# Patient Record
Sex: FEMALE | Race: ASIAN | HISPANIC OR LATINO
[De-identification: names, ages, dates, MRNs, and addresses within clinical notes are randomized per-mention and may not be internally consistent; named-entity substitution may affect disease eponyms.]

---

## 2017-09-15 PROBLEM — Z00.00 ENCOUNTER FOR PREVENTIVE HEALTH EXAMINATION: Status: ACTIVE | Noted: 2017-09-15

## 2017-09-26 ENCOUNTER — APPOINTMENT (OUTPATIENT)
Dept: ORTHOPEDIC SURGERY | Facility: CLINIC | Age: 57
End: 2017-09-26
Payer: OTHER MISCELLANEOUS

## 2017-09-26 VITALS — BODY MASS INDEX: 27.16 KG/M2 | WEIGHT: 163 LBS | HEIGHT: 65 IN

## 2017-09-26 VITALS — DIASTOLIC BLOOD PRESSURE: 78 MMHG | SYSTOLIC BLOOD PRESSURE: 145 MMHG | HEART RATE: 82 BPM

## 2017-09-26 DIAGNOSIS — Z87.09 PERSONAL HISTORY OF OTHER DISEASES OF THE RESPIRATORY SYSTEM: ICD-10-CM

## 2017-09-26 DIAGNOSIS — Z78.9 OTHER SPECIFIED HEALTH STATUS: ICD-10-CM

## 2017-09-26 PROCEDURE — 99244 OFF/OP CNSLTJ NEW/EST MOD 40: CPT

## 2017-09-26 PROCEDURE — 72100 X-RAY EXAM L-S SPINE 2/3 VWS: CPT

## 2017-09-26 PROCEDURE — 72040 X-RAY EXAM NECK SPINE 2-3 VW: CPT

## 2017-09-26 PROCEDURE — 72070 X-RAY EXAM THORAC SPINE 2VWS: CPT

## 2017-11-17 ENCOUNTER — APPOINTMENT (OUTPATIENT)
Dept: ORTHOPEDIC SURGERY | Facility: CLINIC | Age: 57
End: 2017-11-17
Payer: OTHER MISCELLANEOUS

## 2017-11-17 DIAGNOSIS — M54.16 RADICULOPATHY, LUMBAR REGION: ICD-10-CM

## 2017-11-17 DIAGNOSIS — M51.26 OTHER INTERVERTEBRAL DISC DISPLACEMENT, LUMBAR REGION: ICD-10-CM

## 2017-11-17 PROCEDURE — 99215 OFFICE O/P EST HI 40 MIN: CPT

## 2017-12-01 ENCOUNTER — FORM ENCOUNTER (OUTPATIENT)
Age: 57
End: 2017-12-01

## 2017-12-02 ENCOUNTER — APPOINTMENT (OUTPATIENT)
Dept: MRI IMAGING | Facility: CLINIC | Age: 57
End: 2017-12-02
Payer: OTHER MISCELLANEOUS

## 2017-12-02 ENCOUNTER — OUTPATIENT (OUTPATIENT)
Dept: OUTPATIENT SERVICES | Facility: HOSPITAL | Age: 57
LOS: 1 days | End: 2017-12-02
Payer: COMMERCIAL

## 2017-12-02 DIAGNOSIS — Z00.8 ENCOUNTER FOR OTHER GENERAL EXAMINATION: ICD-10-CM

## 2017-12-02 PROCEDURE — 72141 MRI NECK SPINE W/O DYE: CPT | Mod: 26

## 2017-12-02 PROCEDURE — 72141 MRI NECK SPINE W/O DYE: CPT

## 2017-12-04 ENCOUNTER — FORM ENCOUNTER (OUTPATIENT)
Age: 57
End: 2017-12-04

## 2017-12-05 ENCOUNTER — OUTPATIENT (OUTPATIENT)
Dept: OUTPATIENT SERVICES | Facility: HOSPITAL | Age: 57
LOS: 1 days | End: 2017-12-05
Payer: COMMERCIAL

## 2017-12-05 ENCOUNTER — APPOINTMENT (OUTPATIENT)
Dept: MRI IMAGING | Facility: CLINIC | Age: 57
End: 2017-12-05
Payer: OTHER MISCELLANEOUS

## 2017-12-05 DIAGNOSIS — Z00.8 ENCOUNTER FOR OTHER GENERAL EXAMINATION: ICD-10-CM

## 2017-12-05 PROCEDURE — 72148 MRI LUMBAR SPINE W/O DYE: CPT

## 2017-12-05 PROCEDURE — 72148 MRI LUMBAR SPINE W/O DYE: CPT | Mod: 26

## 2017-12-28 ENCOUNTER — APPOINTMENT (OUTPATIENT)
Dept: ORTHOPEDIC SURGERY | Facility: CLINIC | Age: 57
End: 2017-12-28
Payer: OTHER MISCELLANEOUS

## 2017-12-28 DIAGNOSIS — M47.812 SPONDYLOSIS W/OUT MYELOPATHY OR RADICULOPATHY, CERVICAL REGION: ICD-10-CM

## 2017-12-28 DIAGNOSIS — M54.12 RADICULOPATHY, CERVICAL REGION: ICD-10-CM

## 2017-12-28 PROCEDURE — 99215 OFFICE O/P EST HI 40 MIN: CPT

## 2018-02-22 ENCOUNTER — APPOINTMENT (OUTPATIENT)
Dept: ORTHOPEDIC SURGERY | Facility: CLINIC | Age: 58
End: 2018-02-22
Payer: OTHER MISCELLANEOUS

## 2018-02-22 DIAGNOSIS — M50.30 OTHER CERVICAL DISC DEGENERATION, UNSPECIFIED CERVICAL REGION: ICD-10-CM

## 2018-02-22 DIAGNOSIS — M51.36 OTHER INTERVERTEBRAL DISC DEGENERATION, LUMBAR REGION: ICD-10-CM

## 2018-02-22 PROCEDURE — 99215 OFFICE O/P EST HI 40 MIN: CPT

## 2018-07-23 PROBLEM — M47.812 CERVICAL SPONDYLOSIS: Status: ACTIVE | Noted: 2017-12-28

## 2018-08-10 ENCOUNTER — EMERGENCY (EMERGENCY)
Facility: HOSPITAL | Age: 58
LOS: 1 days | Discharge: HOME | End: 2018-08-10
Attending: EMERGENCY MEDICINE
Payer: MEDICAID

## 2018-08-10 VITALS
SYSTOLIC BLOOD PRESSURE: 126 MMHG | DIASTOLIC BLOOD PRESSURE: 69 MMHG | RESPIRATION RATE: 16 BRPM | OXYGEN SATURATION: 99 % | WEIGHT: 158.95 LBS | HEART RATE: 75 BPM | TEMPERATURE: 98 F | HEIGHT: 65 IN

## 2018-08-10 LAB
ALBUMIN SERPL ELPH-MCNC: 4.2 G/DL — SIGNIFICANT CHANGE UP (ref 3.3–5)
ALP SERPL-CCNC: 83 U/L — SIGNIFICANT CHANGE UP (ref 40–120)
ALT FLD-CCNC: 16 U/L — SIGNIFICANT CHANGE UP (ref 10–45)
ANION GAP SERPL CALC-SCNC: 15 MMOL/L — SIGNIFICANT CHANGE UP (ref 5–17)
APTT BLD: 29.4 SEC — SIGNIFICANT CHANGE UP (ref 27.5–37.4)
AST SERPL-CCNC: 16 U/L — SIGNIFICANT CHANGE UP (ref 10–40)
BASOPHILS # BLD AUTO: 0.1 K/UL — SIGNIFICANT CHANGE UP (ref 0–0.2)
BASOPHILS NFR BLD AUTO: 0.6 % — SIGNIFICANT CHANGE UP (ref 0–2)
BILIRUB SERPL-MCNC: 0.7 MG/DL — SIGNIFICANT CHANGE UP (ref 0.2–1.2)
BUN SERPL-MCNC: 14 MG/DL — SIGNIFICANT CHANGE UP (ref 7–23)
CALCIUM SERPL-MCNC: 9.8 MG/DL — SIGNIFICANT CHANGE UP (ref 8.4–10.5)
CHLORIDE SERPL-SCNC: 101 MMOL/L — SIGNIFICANT CHANGE UP (ref 96–108)
CO2 SERPL-SCNC: 23 MMOL/L — SIGNIFICANT CHANGE UP (ref 22–31)
CREAT SERPL-MCNC: 0.7 MG/DL — SIGNIFICANT CHANGE UP (ref 0.5–1.3)
EOSINOPHIL # BLD AUTO: 0.1 K/UL — SIGNIFICANT CHANGE UP (ref 0–0.5)
EOSINOPHIL NFR BLD AUTO: 1.3 % — SIGNIFICANT CHANGE UP (ref 0–6)
GLUCOSE SERPL-MCNC: 99 MG/DL — SIGNIFICANT CHANGE UP (ref 70–99)
HCT VFR BLD CALC: 41.7 % — SIGNIFICANT CHANGE UP (ref 34.5–45)
HGB BLD-MCNC: 14.6 G/DL — SIGNIFICANT CHANGE UP (ref 11.5–15.5)
INR BLD: 1.07 RATIO — SIGNIFICANT CHANGE UP (ref 0.88–1.16)
LYMPHOCYTES # BLD AUTO: 2.5 K/UL — SIGNIFICANT CHANGE UP (ref 1–3.3)
LYMPHOCYTES # BLD AUTO: 25.4 % — SIGNIFICANT CHANGE UP (ref 13–44)
MCHC RBC-ENTMCNC: 30.4 PG — SIGNIFICANT CHANGE UP (ref 27–34)
MCHC RBC-ENTMCNC: 34.9 GM/DL — SIGNIFICANT CHANGE UP (ref 32–36)
MCV RBC AUTO: 87 FL — SIGNIFICANT CHANGE UP (ref 80–100)
MONOCYTES # BLD AUTO: 0.7 K/UL — SIGNIFICANT CHANGE UP (ref 0–0.9)
MONOCYTES NFR BLD AUTO: 7 % — SIGNIFICANT CHANGE UP (ref 2–14)
NEUTROPHILS # BLD AUTO: 6.5 K/UL — SIGNIFICANT CHANGE UP (ref 1.8–7.4)
NEUTROPHILS NFR BLD AUTO: 65.7 % — SIGNIFICANT CHANGE UP (ref 43–77)
PLATELET # BLD AUTO: 288 K/UL — SIGNIFICANT CHANGE UP (ref 150–400)
POTASSIUM SERPL-MCNC: 3.8 MMOL/L — SIGNIFICANT CHANGE UP (ref 3.5–5.3)
POTASSIUM SERPL-SCNC: 3.8 MMOL/L — SIGNIFICANT CHANGE UP (ref 3.5–5.3)
PROT SERPL-MCNC: 7.7 G/DL — SIGNIFICANT CHANGE UP (ref 6–8.3)
PROTHROM AB SERPL-ACNC: 11.7 SEC — SIGNIFICANT CHANGE UP (ref 9.8–12.7)
RBC # BLD: 4.8 M/UL — SIGNIFICANT CHANGE UP (ref 3.8–5.2)
RBC # FLD: 12.3 % — SIGNIFICANT CHANGE UP (ref 10.3–14.5)
SODIUM SERPL-SCNC: 139 MMOL/L — SIGNIFICANT CHANGE UP (ref 135–145)
WBC # BLD: 9.9 K/UL — SIGNIFICANT CHANGE UP (ref 3.8–10.5)
WBC # FLD AUTO: 9.9 K/UL — SIGNIFICANT CHANGE UP (ref 3.8–10.5)

## 2018-08-10 PROCEDURE — 88108 CYTOPATH CONCENTRATE TECH: CPT | Mod: 26

## 2018-08-10 PROCEDURE — 99285 EMERGENCY DEPT VISIT HI MDM: CPT | Mod: 25

## 2018-08-10 PROCEDURE — 62270 DX LMBR SPI PNXR: CPT

## 2018-08-10 PROCEDURE — 70498 CT ANGIOGRAPHY NECK: CPT | Mod: 26

## 2018-08-10 PROCEDURE — 70496 CT ANGIOGRAPHY HEAD: CPT | Mod: 26

## 2018-08-10 RX ORDER — MORPHINE SULFATE 50 MG/1
4 CAPSULE, EXTENDED RELEASE ORAL ONCE
Qty: 0 | Refills: 0 | Status: DISCONTINUED | OUTPATIENT
Start: 2018-08-10 | End: 2018-08-10

## 2018-08-10 RX ADMIN — MORPHINE SULFATE 4 MILLIGRAM(S): 50 CAPSULE, EXTENDED RELEASE ORAL at 18:41

## 2018-08-10 RX ADMIN — MORPHINE SULFATE 4 MILLIGRAM(S): 50 CAPSULE, EXTENDED RELEASE ORAL at 20:12

## 2018-08-10 NOTE — ED PROVIDER NOTE - MEDICAL DECISION MAKING DETAILS
58F headache transfer concern for aneurysm on nchct performed at [facility] now accepted as a transfer by neurosurgery, here notes she has a headache, neuro exam stable, labs, imaging, neurosurg consultation, follow up studies, reassess, dispo.

## 2018-08-10 NOTE — ED PROVIDER NOTE - PLAN OF CARE
You were seen in the emergency department for headache. Please follow up with your neurologist in the next 2-3 days. Continue taking Topamax as prescribed by your neurologist. Return to the emergency department immediately if you experience fever, difficulty walking, worsening headache or any other concerning symptoms.

## 2018-08-10 NOTE — CONSULT NOTE ADULT - ASSESSMENT
58F h/o chornic headaches, has headaches for mulitple weeks now with headache again today, not whol, associated with n/v, and syncopal intact on exam, CTH negative for SAH, but concern for hyperdensity near region of acom. CTH at osh done within 6 hours of initial headache today.   - No acute neurosurgical intervention  - CTA / CTH here  - Will f/u CTH / CTA 58F h/o chornic headaches, has headaches for mulitple weeks now with headache again today, not whol, associated with n/v, and syncopal intact on exam, CTH negative for SAH, but concern for hyperdensity near region of acom. CTH at o[facility] done within 6 hours of initial headache today.   - No acute neurosurgical intervention  - CTA / CTH here  - Will f/u CTH / CTA  - May consider LP but given that initial CTH done within first 6 hours may not be indicated

## 2018-08-10 NOTE — ED PROVIDER NOTE - CARE PLAN
Principal Discharge DX:	Headache Principal Discharge DX:	Headache  Assessment and plan of treatment:	You were seen in the emergency department for headache. Please follow up with your neurologist in the next 2-3 days. Continue taking Topamax as prescribed by your neurologist. Return to the emergency department immediately if you experience fever, difficulty walking, worsening headache or any other concerning symptoms.

## 2018-08-10 NOTE — ED PROVIDER NOTE - PROGRESS NOTE DETAILS
Loc STOCKTON: Patient denies headache, dizziness, nausea. Awaiting CTA results. Loc STOCKTON: Discussed with neurosurgery. CTA shows a 4 mm aneurysm. They recommend an LP. Elizabeth Goldberger PGY-2: Performed LP to r/o xanthochromia/SAH. Prior to LP pt c/o headache and nausea and had 1 brief episode vomiting. Tolerated procedure well, fluid appeared clear.  -LP w/o xanthochromia. D/w neurosx - no surg intervention warranted at this time. Pt was given reglan previously, h/a and naus completely resolved. Pt has neurologist to Follow up with and has topamax as needed for migraines already. Stable for dc ELADIO Vargas MD : reassessed, states sxs are totally resolved. headache gone. LP results w mild traumatic tap downtrending rbcs. cleared by neurosurg. pt states is on topamax w her neuro and will f/u as outpt for ongoing eval. additional verbal instructions regarding diagnosis, return precautions and follow up plan given to pt and/or family.

## 2018-08-10 NOTE — ED ADULT NURSE REASSESSMENT NOTE - NS ED NURSE REASSESS COMMENT FT1
Patient resting in stretcher bed. Awaiting LP by MD Goldberger.  Ipad at bedside for consent to treatment. Safety maintained.

## 2018-08-10 NOTE — ED PROVIDER NOTE - ATTENDING CONTRIBUTION TO CARE
Patient is a 59 yo F who syncopized this AM, woke with a headache and vomited. She went to [facility] and had a CT that showed a questionable aneurysm. Transferred to [facility] for further neurosurgical evaluation. Patient arrived with headache, vomiting. Denies prior episodes of headache.   VS noted  Gen. mild distress  HEENT: EOMI, PERRL, mmm  Lungs: CTAB/L no C/ W /R   CVS: RRR   Abd; Soft non tender, non distended   Ext: no edema  Skin: no rash  Neuro AAOx3, CN 3-12 intact, strength 5/5 in UE/LE, non focal clear speech  a/p: ? aneurysm, concern for SAH - Neurosurgery consulted - recommend CT Head, CTA Head and Neck. Images showed 4 mm aneurysm. LP recommended by NSx. I oversaw the procedure with Dr. Goldberg. Patient signed out to Dr. Vargas pending LP results, reassessment and dispo.   - Loc STOCKTON

## 2018-08-10 NOTE — ED ADULT NURSE NOTE - OBJECTIVE STATEMENT
58 year old female presents to the ED complaining of a headache that began suddenly at 11am this morning. Pt is a transfer from [facility]. As per EMS the Pt had a CT done at [place] that showed a questionable aneurysm and the Pt was sent here for an MRI and an additional Neurological work up. Pt complains of headache, has strong use of all extremeties, on neurological assessment no neural or focal deficits

## 2018-08-10 NOTE — CONSULT NOTE ADULT - SUBJECTIVE AND OBJECTIVE BOX
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 Here notes that she has an ongoing headache, states that she has had them for multiple weeks, but that today was different because she lost consciousness and then began to have multiple episodes of vomiting--subsequently had the nchct at [facility] that was negative fo sah. Denies fevers, chills, ongoing nausea, does have a headache currently, no shortness of breath.    patient state this is not whol, denies blurry vision, double vision, weakness, numbness    PAST MEDICAL HISTORY   No pertinent past medical history    PAST SURGICAL HISTORY   No significant past surgical history        MEDICATIONS:  Antibiotics:    Neuro:    Anticoagulation:    Other:      SOCIAL HISTORY:   Occupation:   Marital Status:     FAMILY HISTORY:  No pertinent family history in first degree relatives      REVIEW OF SYSTEMS:  negative but for hpi  General:  Eyes:  ENT:  Cardiac:  Respiratory:  GI:  Musculoskeletal:   Skin:  Neurologic:   Psychiatric:     PHYSICAL EXAMINATION:   T(C): 37.2 (08-10-18 @ 18:38), Max: 37.2 (08-10-18 @ 18:38)  HR: 67 (08-10-18 @ 18:38) (67 - 75)  BP: 121/69 (08-10-18 @ 18:38) (121/69 - 126/69)  RR: 16 (08-10-18 @ 18:38) (16 - 16)  SpO2: 98% (08-10-18 @ 18:38) (98% - 99%)  Wt(kg): --Height (cm): 165.1 (08-10 @ 15:54)  Weight (kg): 72.1 (08-10 @ 15:54)    General Examination:     Neurologic Examination:           AOx3, FC, PERRL, EOMI, V1-3 intact, no facial, palate taniya symmetric, tongue midline, shrug 5/5  5/5 throughout, no drift  SILT  No clonus or babinski    LABS:                        14.6   9.9   )-----------( 288      ( 10 Aug 2018 17:50 )             41.7     08-10    139  |  101  |  14  ----------------------------<  99  3.8   |  23  |  0.70    Ca    9.8      10 Aug 2018 17:50    TPro  7.7  /  Alb  4.2  /  TBili  0.7  /  DBili  x   /  AST  16  /  ALT  16  /  AlkPhos  83  08-10    PT/INR - ( 10 Aug 2018 17:50 )   PT: 11.7 sec;   INR: 1.07 ratio         PTT - ( 10 Aug 2018 17:50 )  PTT:29.4 sec      RADIOLOGY & ADDITIONAL STUDIES:    CTH at OSH - no sah, possible acomm hyperdensity

## 2018-08-10 NOTE — ED PROVIDER NOTE - OBJECTIVE STATEMENT
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 Here notes that she has an ongoing headache, states that she has had them for multiple weeks, but that today was different because she lost consciousness and then began to have multiple episodes of vomiting--subsequently had the nchct at [place]. Denies fevers, chills, ongoing nausea, does have a headache currently, no shortness of breath.

## 2018-08-10 NOTE — ED PROVIDER NOTE - PHYSICAL EXAMINATION
Gen: NAD, non-toxic, conversational  Eyes: PERRLA, EOMI   HENT: Normocephalic, atraumatic. External ears normal, no rhinorrhea, moist mucous membranes.   CV: RRR, no M/R/G  Resp: CTAB, non-labored, speaking without difficulty on room air  Abd: soft, non tender, non rigid, no guarding or rebound tenderness  Back: No CVAT bilaterally, no midline ttp  Skin: dry, wwp   Neuro: AOx3, speech is fluent and appropriate, CN 2-12 intact, motor 5/5 & symmetric in BUE/BLE  Psych: Mood concerned, affect euthymic

## 2018-08-11 VITALS
OXYGEN SATURATION: 98 % | DIASTOLIC BLOOD PRESSURE: 57 MMHG | TEMPERATURE: 98 F | RESPIRATION RATE: 16 BRPM | HEART RATE: 65 BPM | SYSTOLIC BLOOD PRESSURE: 113 MMHG

## 2018-08-11 LAB
APPEARANCE CSF: CLEAR — SIGNIFICANT CHANGE UP
APPEARANCE CSF: CLEAR — SIGNIFICANT CHANGE UP
COLOR CSF: SIGNIFICANT CHANGE UP
COLOR CSF: SIGNIFICANT CHANGE UP
CRYPTOC AG CSF-ACNC: NEGATIVE — SIGNIFICANT CHANGE UP
CSF PCR RESULT: SIGNIFICANT CHANGE UP
GLUCOSE CSF-MCNC: 70 MG/DL — SIGNIFICANT CHANGE UP (ref 40–70)
GRAM STN FLD: SIGNIFICANT CHANGE UP
LYMPHOCYTES # CSF: 54 % — SIGNIFICANT CHANGE UP (ref 40–80)
LYMPHOCYTES # CSF: 55 % — SIGNIFICANT CHANGE UP (ref 40–80)
MONOS+MACROS NFR CSF: 40 % — SIGNIFICANT CHANGE UP (ref 15–45)
MONOS+MACROS NFR CSF: 45 % — SIGNIFICANT CHANGE UP (ref 15–45)
NEUTROPHILS # CSF: 1 % — SIGNIFICANT CHANGE UP (ref 0–6)
NEUTROPHILS # CSF: 5 % — SIGNIFICANT CHANGE UP (ref 0–6)
NRBC NFR CSF: 1 /UL — SIGNIFICANT CHANGE UP (ref 0–5)
NRBC NFR CSF: <1 /UL — SIGNIFICANT CHANGE UP (ref 0–5)
PROT CSF-MCNC: 36 MG/DL — SIGNIFICANT CHANGE UP (ref 15–45)
RBC # CSF: 3 /UL — HIGH (ref 0–0)
RBC # CSF: 81 /UL — HIGH (ref 0–0)
SPECIMEN SOURCE: SIGNIFICANT CHANGE UP
TUBE TYPE: SIGNIFICANT CHANGE UP
TUBE TYPE: SIGNIFICANT CHANGE UP

## 2018-08-11 PROCEDURE — 80053 COMPREHEN METABOLIC PANEL: CPT

## 2018-08-11 PROCEDURE — 86592 SYPHILIS TEST NON-TREP QUAL: CPT

## 2018-08-11 PROCEDURE — 87205 SMEAR GRAM STAIN: CPT

## 2018-08-11 PROCEDURE — 87476 LYME DIS DNA AMP PROBE: CPT

## 2018-08-11 PROCEDURE — 82945 GLUCOSE OTHER FLUID: CPT

## 2018-08-11 PROCEDURE — 96374 THER/PROPH/DIAG INJ IV PUSH: CPT | Mod: XU

## 2018-08-11 PROCEDURE — 70498 CT ANGIOGRAPHY NECK: CPT

## 2018-08-11 PROCEDURE — 86789 WEST NILE VIRUS ANTIBODY: CPT

## 2018-08-11 PROCEDURE — 85610 PROTHROMBIN TIME: CPT

## 2018-08-11 PROCEDURE — 85027 COMPLETE CBC AUTOMATED: CPT

## 2018-08-11 PROCEDURE — 84157 ASSAY OF PROTEIN OTHER: CPT

## 2018-08-11 PROCEDURE — 87483 CNS DNA AMP PROBE TYPE 12-25: CPT

## 2018-08-11 PROCEDURE — 70450 CT HEAD/BRAIN W/O DYE: CPT

## 2018-08-11 PROCEDURE — 62270 DX LMBR SPI PNXR: CPT

## 2018-08-11 PROCEDURE — 70496 CT ANGIOGRAPHY HEAD: CPT

## 2018-08-11 PROCEDURE — 85730 THROMBOPLASTIN TIME PARTIAL: CPT

## 2018-08-11 PROCEDURE — 86403 PARTICLE AGGLUT ANTBDY SCRN: CPT

## 2018-08-11 PROCEDURE — 96375 TX/PRO/DX INJ NEW DRUG ADDON: CPT | Mod: XU

## 2018-08-11 PROCEDURE — 89051 BODY FLUID CELL COUNT: CPT

## 2018-08-11 PROCEDURE — 87102 FUNGUS ISOLATION CULTURE: CPT

## 2018-08-11 PROCEDURE — 87070 CULTURE OTHR SPECIMN AEROBIC: CPT

## 2018-08-11 PROCEDURE — 99285 EMERGENCY DEPT VISIT HI MDM: CPT | Mod: 25

## 2018-08-11 PROCEDURE — 86788 WEST NILE VIRUS AB IGM: CPT

## 2018-08-11 PROCEDURE — 87116 MYCOBACTERIA CULTURE: CPT

## 2018-08-11 RX ORDER — METOCLOPRAMIDE HCL 10 MG
10 TABLET ORAL ONCE
Qty: 0 | Refills: 0 | Status: COMPLETED | OUTPATIENT
Start: 2018-08-11 | End: 2018-08-11

## 2018-08-11 RX ADMIN — Medication 10 MILLIGRAM(S): at 00:14

## 2018-08-11 NOTE — ED ADULT NURSE REASSESSMENT NOTE - NS ED NURSE REASSESS COMMENT FT1
Patient lying flat after lumbar puncture. Patient is c/o headache. Pain medication provided. Patient a/ox3, safety maintained. VSS.

## 2018-08-11 NOTE — ED PROCEDURE NOTE - ATTENDING CONTRIBUTION TO CARE
I have participated in and supervised all key portions of the above procedures and agree with the above documentation. - Loc STOCKTON

## 2018-08-12 NOTE — ED POST DISCHARGE NOTE - RESULT SUMMARY
AFB insufficient sample provided.  LP performed to rule out xanthochromia given aneurysm.  Patient's symptoms had completely resolved by discharge.  no indication to repeat LP at this time.  -Pa Robles PA-C AFB insufficient sample provided.  LP performed to rule out xanthochromia given aneurysm.  Patient's symptoms had completely resolved by discharge.  Patient asymptomatic, no fevers/chills. no indication to repeat LP at this time.  -Pa Robles PA-C

## 2018-08-12 NOTE — ED POST DISCHARGE NOTE - DETAILS
[facility] IGG + for [place]. attempted to contact patient. only 1 phone number listed, attempted to call and it is disconnected. - Anish Moon PA-C unable to leave message. phone disconnected. certified letter sent.. Lab called to notify of critical result, Called pt and left voicemail informing that I had results to discuss with the patient, gave call back number. Certified letter already sent. -Talon Lazcano PA-C

## 2018-08-13 LAB — VDRL CSF-TITR: NEGATIVE — SIGNIFICANT CHANGE UP

## 2018-08-14 LAB
CULTURE RESULTS: NO GROWTH — SIGNIFICANT CHANGE UP
SPECIMEN SOURCE: SIGNIFICANT CHANGE UP
TM INTERPRETATION: SIGNIFICANT CHANGE UP

## 2018-08-16 LAB — B BURGDOR DNA SPEC QL NAA+PROBE: NEGATIVE — SIGNIFICANT CHANGE UP

## 2018-09-08 LAB
CULTURE RESULTS: SIGNIFICANT CHANGE UP
SPECIMEN SOURCE: SIGNIFICANT CHANGE UP
